# Patient Record
Sex: MALE | Race: WHITE | NOT HISPANIC OR LATINO | URBAN - METROPOLITAN AREA
[De-identification: names, ages, dates, MRNs, and addresses within clinical notes are randomized per-mention and may not be internally consistent; named-entity substitution may affect disease eponyms.]

---

## 2020-06-30 ENCOUNTER — OFFICE VISIT (OUTPATIENT)
Dept: URBAN - METROPOLITAN AREA CLINIC 94 | Facility: CLINIC | Age: 60
End: 2020-06-30
Payer: MEDICARE

## 2020-06-30 ENCOUNTER — WEB ENCOUNTER (OUTPATIENT)
Dept: URBAN - METROPOLITAN AREA CLINIC 94 | Facility: CLINIC | Age: 60
End: 2020-06-30

## 2020-06-30 DIAGNOSIS — K74.60 CIRRHOSIS: ICD-10-CM

## 2020-06-30 DIAGNOSIS — R13.10 DYSPHAGIA: ICD-10-CM

## 2020-06-30 PROCEDURE — 3017F COLORECTAL CA SCREEN DOC REV: CPT | Performed by: INTERNAL MEDICINE

## 2020-06-30 PROCEDURE — 99213 OFFICE O/P EST LOW 20 MIN: CPT | Performed by: INTERNAL MEDICINE

## 2020-06-30 PROCEDURE — G8420 CALC BMI NORM PARAMETERS: HCPCS | Performed by: INTERNAL MEDICINE

## 2020-06-30 PROCEDURE — G8427 DOCREV CUR MEDS BY ELIG CLIN: HCPCS | Performed by: INTERNAL MEDICINE

## 2020-06-30 RX ORDER — LACTULOSE 10 G/15ML
TAKE 30 MILLILITERS (20 GRAM) BY ORAL ROUTE ONCE DAILY FOR 30 DAYS SOLUTION ORAL 1
Qty: 900 | Refills: 6 | Status: ON HOLD | COMMUNITY
Start: 2020-01-27 | End: 2020-08-24

## 2020-06-30 RX ORDER — NADOLOL 20 MG/1
TAKE 1 TABLET (20 MG) BY ORAL ROUTE ONCE DAILY FOR 90 DAYS TABLET ORAL 1
Qty: 90 | Refills: 3 | Status: ACTIVE | COMMUNITY
Start: 2020-01-27 | End: 2021-01-21

## 2020-06-30 RX ORDER — LEVOTHYROXINE SODIUM 0.12 MG/1
1 TABLET IN THE MORNING ON AN EMPTY STOMACH TABLET ORAL
Status: ACTIVE | COMMUNITY

## 2020-06-30 RX ORDER — OMEPRAZOLE 40 MG/1
TAKE 1 CAPSULE BY ORAL ROUTE DAILY FOR 30 DAYS CAPSULE, DELAYED RELEASE PELLETS ORAL 1
Qty: 30 | Refills: 11 | Status: ACTIVE | COMMUNITY
Start: 2020-01-27 | End: 2021-01-21

## 2020-06-30 RX ORDER — RIFAXIMIN 550 MG/1
TAKE 1 TABLET (550 MG) BY ORAL ROUTE 2 TIMES PER DAY FOR 30 DAYS TABLET ORAL 2
Qty: 60 | Refills: 10 | Status: ACTIVE | COMMUNITY
Start: 2020-01-27 | End: 2020-12-22

## 2020-06-30 NOTE — HPI-TODAY'S VISIT:
Pt states that he continues to have dysphagia. He had jaw surgery several years ago and has developed dysphagia since then. He also has history of ETOH cirrhosis with esophageal varices which have been eradicated . Recent EGD 5/2020 showed benign intrinsic stenosis at [facility] which dilated to 15 mm. There were no esophageal varices. A hiatal hernioa was seen.  Swallowing improved transiently, but now having trouble swallowing solids again. GERD symptoms well controlled with PPI daily. From cirrhosis standpoint, HE well controlled with xifaxan.

## 2021-01-21 ENCOUNTER — ERX REFILL RESPONSE (OUTPATIENT)
Age: 61
End: 2021-01-21

## 2021-01-21 RX ORDER — NADOLOL 20 MG/1
TAKE 1 TABLET DAILY TABLET ORAL
Qty: 90 | Refills: 3

## 2021-02-01 ENCOUNTER — ERX REFILL RESPONSE (OUTPATIENT)
Age: 61
End: 2021-02-01

## 2021-02-01 RX ORDER — RIFAXIMIN 550 MG/1
TAKE 1 TABLET TWICE A DAY TABLET ORAL
Qty: 60 | Refills: 11

## 2021-04-05 ENCOUNTER — WEB ENCOUNTER (OUTPATIENT)
Dept: URBAN - METROPOLITAN AREA CLINIC 94 | Facility: CLINIC | Age: 61
End: 2021-04-05

## 2021-04-05 ENCOUNTER — OFFICE VISIT (OUTPATIENT)
Dept: URBAN - METROPOLITAN AREA CLINIC 94 | Facility: CLINIC | Age: 61
End: 2021-04-05
Payer: MEDICARE

## 2021-04-05 DIAGNOSIS — R13.10 DYSPHAGIA, UNSPECIFIED TYPE: ICD-10-CM

## 2021-04-05 DIAGNOSIS — K22.2 ESOPHAGEAL STRICTURE: ICD-10-CM

## 2021-04-05 PROCEDURE — 99213 OFFICE O/P EST LOW 20 MIN: CPT | Performed by: INTERNAL MEDICINE

## 2021-04-05 RX ORDER — LEVOTHYROXINE SODIUM 0.12 MG/1
1 TABLET IN THE MORNING ON AN EMPTY STOMACH TABLET ORAL
Status: ACTIVE | COMMUNITY

## 2021-04-05 RX ORDER — NADOLOL 20 MG/1
TAKE 1 TABLET DAILY TABLET ORAL
Qty: 90 | Refills: 3 | Status: ACTIVE | COMMUNITY

## 2021-04-05 RX ORDER — RIFAXIMIN 550 MG/1
TAKE 1 TABLET TWICE A DAY TABLET ORAL
Qty: 60 | Refills: 11 | Status: ACTIVE | COMMUNITY

## 2021-04-05 NOTE — HPI-TODAY'S VISIT:
Pt states that he has developed recurrent dysphagia for solids. He had jaw surgery several years ago and has developed dysphagia since then. He also has history of ETOH cirrhosis with esophageal varices which have been eradicated . EGD 5/2020 showed benign intrinsic stenosis at [facility] which dilated to 15 mm. There were no esophageal varices. Swallowing improved transiently, but then dysphagia recurred. Pt underwent repeat EGD with dilation with Dr Byrne at [facility] 11/2020. Pt states that agian dysphagia improved for a few months. He has now developed severe dysphagia for solids again.

## 2021-05-14 ENCOUNTER — OFFICE VISIT (OUTPATIENT)
Dept: URBAN - METROPOLITAN AREA SURGERY CENTER 17 | Facility: SURGERY CENTER | Age: 61
End: 2021-05-14
Payer: MEDICARE

## 2021-05-14 DIAGNOSIS — K22.2 ACQUIRED ESOPHAGEAL RING: ICD-10-CM

## 2021-05-14 DIAGNOSIS — R13.19 CERVICAL DYSPHAGIA: ICD-10-CM

## 2021-05-14 PROCEDURE — 43249 ESOPH EGD DILATION <30 MM: CPT | Performed by: INTERNAL MEDICINE

## 2021-05-14 PROCEDURE — G8907 PT DOC NO EVENTS ON DISCHARG: HCPCS | Performed by: INTERNAL MEDICINE

## 2021-06-07 ENCOUNTER — OFFICE VISIT (OUTPATIENT)
Dept: URBAN - METROPOLITAN AREA CLINIC 94 | Facility: CLINIC | Age: 61
End: 2021-06-07
Payer: MEDICARE

## 2021-06-07 ENCOUNTER — OFFICE VISIT (OUTPATIENT)
Dept: URBAN - METROPOLITAN AREA CLINIC 94 | Facility: CLINIC | Age: 61
End: 2021-06-07

## 2021-06-07 VITALS
TEMPERATURE: 98.2 F | BODY MASS INDEX: 22.05 KG/M2 | SYSTOLIC BLOOD PRESSURE: 121 MMHG | WEIGHT: 154 LBS | HEART RATE: 63 BPM | HEIGHT: 70 IN | DIASTOLIC BLOOD PRESSURE: 73 MMHG

## 2021-06-07 DIAGNOSIS — R13.19 CERVICAL DYSPHAGIA: ICD-10-CM

## 2021-06-07 DIAGNOSIS — K22.2 ESOPHAGEAL STRICTURE: ICD-10-CM

## 2021-06-07 DIAGNOSIS — K70.30 ALCOHOLIC CIRRHOSIS OF LIVER WITHOUT ASCITES: ICD-10-CM

## 2021-06-07 PROCEDURE — 99213 OFFICE O/P EST LOW 20 MIN: CPT | Performed by: INTERNAL MEDICINE

## 2021-06-07 RX ORDER — RIFAXIMIN 550 MG/1
TAKE 1 TABLET TWICE A DAY TABLET ORAL
Qty: 60 | Refills: 11 | Status: ACTIVE | COMMUNITY

## 2021-06-07 RX ORDER — NADOLOL 20 MG/1
TAKE 1 TABLET DAILY TABLET ORAL
Qty: 90 | Refills: 3 | Status: ACTIVE | COMMUNITY

## 2021-06-07 RX ORDER — LEVOTHYROXINE SODIUM 0.12 MG/1
1 TABLET IN THE MORNING ON AN EMPTY STOMACH TABLET ORAL
Status: ACTIVE | COMMUNITY

## 2021-06-07 NOTE — HPI-TODAY'S VISIT:
59 yo M returns today for post proedure f/u visit. EGD 5/2021 Esophageal stenosis with dilation otherwise normal esophagus, stomach, and duodenum. No Bx. Since his procedure his swallowing is doing much better. He is very careful with his oral intake and only tolerates liquids since having cancer.   Pt states that he has developed recurrent dysphagia for solids. 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 Secondary to this he develops esophageal strictures causing dypshagia previously responsive to EGD with dilation since then. He also has history of ETOH cirrhosis with esophageal varices which have been eradicated . EGD 5/2020 showed benign intrinsic stenosis at [facility] which dilated to 15 mm. There were no esophageal varices. Swallowing improved transiently, but then dysphagia recurred.

## 2021-06-08 LAB
A/G RATIO: 1.7
ALBUMIN: 4.5
ALKALINE PHOSPHATASE: 151
ALT (SGPT): 19
AST (SGOT): 33
BASO (ABSOLUTE): 0.1
BASOS: 1
BILIRUBIN, TOTAL: 0.2
BUN/CREATININE RATIO: 19
BUN: 25
CALCIUM: 9.9
CARBON DIOXIDE, TOTAL: 26
CHLORIDE: 101
CREATININE: 1.3
EGFR IF AFRICN AM: 68
EGFR IF NONAFRICN AM: 58
EOS (ABSOLUTE): 1.6
EOS: 20
GLOBULIN, TOTAL: 2.6
GLUCOSE: 115
HEMATOCRIT: 37.5
HEMATOLOGY COMMENTS:: (no result)
HEMOGLOBIN: 12.3
IMMATURE CELLS: (no result)
IMMATURE GRANS (ABS): 0
IMMATURE GRANULOCYTES: 0
INR: 1
LYMPHS (ABSOLUTE): 2.4
LYMPHS: 30
MCH: 30.5
MCHC: 32.8
MCV: 93
MONOCYTES(ABSOLUTE): 0.7
MONOCYTES: 8
NEUTROPHILS (ABSOLUTE): 3.3
NEUTROPHILS: 41
NRBC: (no result)
PLATELETS: 150
POTASSIUM: 4.7
PROTEIN, TOTAL: 7.1
PROTHROMBIN TIME: 10.9
RBC: 4.03
RDW: 12.7
SODIUM: 138
WBC: 8.1

## 2021-11-11 ENCOUNTER — TELEPHONE ENCOUNTER (OUTPATIENT)
Dept: URBAN - METROPOLITAN AREA CLINIC 94 | Facility: CLINIC | Age: 61
End: 2021-11-11

## 2021-11-18 ENCOUNTER — OFFICE VISIT (OUTPATIENT)
Dept: URBAN - METROPOLITAN AREA SURGERY CENTER 17 | Facility: SURGERY CENTER | Age: 61
End: 2021-11-18
Payer: MEDICARE

## 2021-11-18 ENCOUNTER — CLAIMS CREATED FROM THE CLAIM WINDOW (OUTPATIENT)
Dept: URBAN - METROPOLITAN AREA CLINIC 4 | Facility: CLINIC | Age: 61
End: 2021-11-18
Payer: MEDICARE

## 2021-11-18 DIAGNOSIS — K22.2 ACQUIRED ESOPHAGEAL RING: ICD-10-CM

## 2021-11-18 DIAGNOSIS — K22.89 ESOPHAGEAL BLEEDING: ICD-10-CM

## 2021-11-18 DIAGNOSIS — K22.8 OTHER SPECIFIED DISEASES OF ESOPHAGUS: ICD-10-CM

## 2021-11-18 DIAGNOSIS — R13.19 CERVICAL DYSPHAGIA: ICD-10-CM

## 2021-11-18 PROCEDURE — 88305 TISSUE EXAM BY PATHOLOGIST: CPT | Performed by: PATHOLOGY

## 2021-11-18 PROCEDURE — G8907 PT DOC NO EVENTS ON DISCHARG: HCPCS | Performed by: INTERNAL MEDICINE

## 2021-11-18 PROCEDURE — 43249 ESOPH EGD DILATION <30 MM: CPT | Performed by: INTERNAL MEDICINE

## 2021-11-18 PROCEDURE — 43239 EGD BIOPSY SINGLE/MULTIPLE: CPT | Performed by: INTERNAL MEDICINE

## 2021-11-18 RX ORDER — RIFAXIMIN 550 MG/1
TAKE 1 TABLET TWICE A DAY TABLET ORAL
Qty: 60 | Refills: 11 | Status: ACTIVE | COMMUNITY

## 2021-11-18 RX ORDER — PANTOPRAZOLE SODIUM 40 MG/1
1 TABLET TABLET, DELAYED RELEASE ORAL ONCE A DAY
Qty: 90 TABLET | Refills: 3 | OUTPATIENT
Start: 2021-11-18

## 2021-11-18 RX ORDER — NADOLOL 20 MG/1
TAKE 1 TABLET DAILY TABLET ORAL
Qty: 90 | Refills: 3 | Status: ACTIVE | COMMUNITY

## 2021-11-18 RX ORDER — LEVOTHYROXINE SODIUM 0.12 MG/1
1 TABLET IN THE MORNING ON AN EMPTY STOMACH TABLET ORAL
Status: ACTIVE | COMMUNITY

## 2021-12-03 ENCOUNTER — OFFICE VISIT (OUTPATIENT)
Dept: URBAN - METROPOLITAN AREA CLINIC 94 | Facility: CLINIC | Age: 61
End: 2021-12-03
Payer: MEDICARE

## 2021-12-03 VITALS
DIASTOLIC BLOOD PRESSURE: 84 MMHG | SYSTOLIC BLOOD PRESSURE: 135 MMHG | HEIGHT: 70 IN | HEART RATE: 62 BPM | BODY MASS INDEX: 18.75 KG/M2 | TEMPERATURE: 97 F | WEIGHT: 131 LBS

## 2021-12-03 DIAGNOSIS — R63.4 WEIGHT LOSS: ICD-10-CM

## 2021-12-03 DIAGNOSIS — R13.19 CERVICAL DYSPHAGIA: ICD-10-CM

## 2021-12-03 DIAGNOSIS — K70.30 ALCOHOLIC CIRRHOSIS OF LIVER WITHOUT ASCITES: ICD-10-CM

## 2021-12-03 DIAGNOSIS — K22.2 ESOPHAGEAL STRICTURE: ICD-10-CM

## 2021-12-03 PROCEDURE — 99214 OFFICE O/P EST MOD 30 MIN: CPT | Performed by: PHYSICIAN ASSISTANT

## 2021-12-03 RX ORDER — RIFAXIMIN 550 MG/1
TAKE 1 TABLET TWICE A DAY TABLET ORAL
Qty: 60 | Refills: 11 | Status: ACTIVE | COMMUNITY

## 2021-12-03 RX ORDER — NADOLOL 20 MG/1
TAKE 1 TABLET DAILY TABLET ORAL
Qty: 90 | Refills: 3 | Status: ACTIVE | COMMUNITY

## 2021-12-03 RX ORDER — PANTOPRAZOLE SODIUM 40 MG/1
1 TABLET TABLET, DELAYED RELEASE ORAL ONCE A DAY
Qty: 90 TABLET | Refills: 3 | Status: ACTIVE | COMMUNITY
Start: 2021-11-18

## 2021-12-03 RX ORDER — LEVOTHYROXINE SODIUM 0.12 MG/1
1 TABLET IN THE MORNING ON AN EMPTY STOMACH TABLET ORAL
Status: ACTIVE | COMMUNITY

## 2021-12-03 NOTE — HPI-TODAY'S VISIT:
59 yo M returns today for post procedure f/u visit.   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 Bx negative.   Since his procedure, the patient states that he feels swallowing is not any better and almost feels worse. He reports that he is no longer able to tolerate any solids foods. He is only able to drink Ensure for nutrition and has had a 20 lb wt loss since his last visit. He reports some wt loss from having COVID-19 3-4 weeks ago. Pt reports dysphagia with medications.   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 Secondary to this he develops esophageal strictures causing dysphagia previously responsive to EGD with dilation. He also has history of ETOH cirrhosis with esophageal varices .   EGD 5/2021 Esophageal stenosis with dilation otherwise normal esophagus, stomach, and duodenum. No Bx.   EGD 5/2020 showed benign intrinsic stenosis at [facility] which dilated to 15 mm. There were no esophageal varices. Swallowing improved transiently, but then dysphagia recurred.

## 2021-12-03 NOTE — PHYSICAL EXAM CONSTITUTIONAL:
well developed, well nourished , in no acute distress , ambulating without difficulty , normal communication ability dizziness while lying down

## 2021-12-04 LAB
A/G RATIO: 1.8
AFP, SERUM, TUMOR MARKER: 2.4
ALBUMIN: 4.8
ALKALINE PHOSPHATASE: 163
ALT (SGPT): 21
AST (SGOT): 41
BASO (ABSOLUTE): 0.1
BASOS: 1
BILIRUBIN, TOTAL: 0.5
BUN/CREATININE RATIO: 20
BUN: 27
CALCIUM: 10.5
CARBON DIOXIDE, TOTAL: 26
CHLORIDE: 101
CREATININE: 1.36
EGFR IF AFRICN AM: 64
EGFR IF NONAFRICN AM: 55
EOS (ABSOLUTE): 1.4
EOS: 18
GLOBULIN, TOTAL: 2.7
GLUCOSE: 114
HEMATOCRIT: 41.6
HEMATOLOGY COMMENTS:: (no result)
HEMOGLOBIN A1C: 5.7
HEMOGLOBIN: 13.5
IMMATURE CELLS: (no result)
IMMATURE GRANS (ABS): 0
IMMATURE GRANULOCYTES: 0
INR: 1
LYMPHS (ABSOLUTE): 2.2
LYMPHS: 28
MCH: 29.9
MCHC: 32.5
MCV: 92
MONOCYTES(ABSOLUTE): 0.8
MONOCYTES: 10
NEUTROPHILS (ABSOLUTE): 3.4
NEUTROPHILS: 43
NRBC: (no result)
PLATELETS: 164
POTASSIUM: 4.4
PROTEIN, TOTAL: 7.5
PROTHROMBIN TIME: 10.9
RBC: 4.51
RDW: 13
SODIUM: 140
WBC: 8

## 2021-12-14 ENCOUNTER — OUT OF OFFICE VISIT (OUTPATIENT)
Dept: URBAN - METROPOLITAN AREA MEDICAL CENTER 34 | Facility: MEDICAL CENTER | Age: 61
End: 2021-12-14
Payer: MEDICARE

## 2021-12-14 DIAGNOSIS — Z93.1 FEEDING BY G-TUBE: ICD-10-CM

## 2021-12-14 DIAGNOSIS — R63.4 ABNORMAL INTENTIONAL WEIGHT LOSS: ICD-10-CM

## 2021-12-14 DIAGNOSIS — K70.30 ALC (ALCOHOLIC LIVER CIRRHOSIS): ICD-10-CM

## 2021-12-14 DIAGNOSIS — Z87.19 H/O ACUTE PANCREATITIS: ICD-10-CM

## 2021-12-14 DIAGNOSIS — R13.13 PHARYNGEAL DYSPHAGIA: ICD-10-CM

## 2021-12-14 PROCEDURE — G8427 DOCREV CUR MEDS BY ELIG CLIN: HCPCS | Performed by: INTERNAL MEDICINE

## 2021-12-14 PROCEDURE — 99232 SBSQ HOSP IP/OBS MODERATE 35: CPT | Performed by: INTERNAL MEDICINE

## 2021-12-14 PROCEDURE — 99222 1ST HOSP IP/OBS MODERATE 55: CPT | Performed by: INTERNAL MEDICINE

## 2021-12-15 ENCOUNTER — OUT OF OFFICE VISIT (OUTPATIENT)
Dept: URBAN - METROPOLITAN AREA MEDICAL CENTER 34 | Facility: MEDICAL CENTER | Age: 61
End: 2021-12-15
Payer: MEDICARE

## 2021-12-15 DIAGNOSIS — R13.13 PHARYNGEAL DYSPHAGIA: ICD-10-CM

## 2021-12-15 DIAGNOSIS — K70.30 ALC (ALCOHOLIC LIVER CIRRHOSIS): ICD-10-CM

## 2021-12-15 DIAGNOSIS — R63.4 ABNORMAL INTENTIONAL WEIGHT LOSS: ICD-10-CM

## 2021-12-15 PROCEDURE — 99232 SBSQ HOSP IP/OBS MODERATE 35: CPT | Performed by: INTERNAL MEDICINE

## 2021-12-29 ENCOUNTER — OFFICE VISIT (OUTPATIENT)
Dept: URBAN - METROPOLITAN AREA CLINIC 94 | Facility: CLINIC | Age: 61
End: 2021-12-29

## 2022-01-19 ENCOUNTER — OFFICE VISIT (OUTPATIENT)
Dept: URBAN - METROPOLITAN AREA CLINIC 94 | Facility: CLINIC | Age: 62
End: 2022-01-19
Payer: MEDICARE

## 2022-01-19 VITALS
TEMPERATURE: 97.5 F | HEIGHT: 70 IN | SYSTOLIC BLOOD PRESSURE: 121 MMHG | HEART RATE: 68 BPM | BODY MASS INDEX: 18.75 KG/M2 | WEIGHT: 131 LBS | DIASTOLIC BLOOD PRESSURE: 73 MMHG

## 2022-01-19 DIAGNOSIS — Z93.1 GASTROSTOMY TUBE IN PLACE: ICD-10-CM

## 2022-01-19 DIAGNOSIS — K72.90 HEPATIC ENCEPHALOPATHY: ICD-10-CM

## 2022-01-19 DIAGNOSIS — K22.2 ESOPHAGEAL STRICTURE: ICD-10-CM

## 2022-01-19 DIAGNOSIS — K70.30 ALCOHOLIC CIRRHOSIS OF LIVER WITHOUT ASCITES: ICD-10-CM

## 2022-01-19 PROCEDURE — 99214 OFFICE O/P EST MOD 30 MIN: CPT | Performed by: PHYSICIAN ASSISTANT

## 2022-01-19 RX ORDER — RIFAXIMIN 550 MG/1
TAKE 1 TABLET TWICE A DAY TABLET ORAL
Qty: 60 | Refills: 11 | Status: ACTIVE | COMMUNITY

## 2022-01-19 RX ORDER — LEVOTHYROXINE SODIUM 0.12 MG/1
1 TABLET IN THE MORNING ON AN EMPTY STOMACH TABLET ORAL
Status: ACTIVE | COMMUNITY

## 2022-01-19 RX ORDER — PANTOPRAZOLE SODIUM 40 MG/1
1 TABLET TABLET, DELAYED RELEASE ORAL ONCE A DAY
Qty: 90 TABLET | Refills: 3 | Status: ACTIVE | COMMUNITY
Start: 2021-11-18

## 2022-01-19 RX ORDER — NADOLOL 20 MG/1
TAKE 1 TABLET DAILY TABLET ORAL
Qty: 90 | Refills: 3 | Status: ACTIVE | COMMUNITY

## 2022-01-19 NOTE — PHYSICAL EXAM GASTROINTESTINAL
Abdomen , soft, gastrostomy tube LUQ, mild tenderness around tube without erythema or drainage, nondistended , no guarding or rigidity , no masses palpable , normal bowel sounds , Liver and Spleen , no hepatomegaly present , no hepatosplenomegaly , liver nontender , spleen not palpable

## 2022-01-19 NOTE — HPI-TODAY'S VISIT:
61 yo M returns today for hx of cirrhosis and esophageal stenosis s/p gastrostomy tube. Pt is accompanied by wife.   Pt hospitalized at [facility] 12/13-12/17 for esophageal dysphagia with inability to tolerate solids or liquids without choking. MBS revealed Moderate /Severe oropharyngeal dysphagia. Pt recommended having gastrostomy tube placed again. Gastrostomy tube successfully placed on 12/16.   Today overall patient is doing well. He reports mild tenderness around gastrostomy site with palpation, but otherwise he is fine. Per pt wife, he has had a 10 lb weight again since having tube placed.  Pt denies N/V or hematemesis. Pt denies melena or hematochezia. Pt denies ever having a colonoscopy and refuses to have one. Pt's wife denies altered mental status.   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 Secondary to this he develops esophageal strictures causing dysphagia previously responsive to EGD with dilation. He also has history of ETOH cirrhosis with esophageal varices .   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 Bx negative.   EGD 5/2021 Esophageal stenosis with dilation otherwise normal esophagus, stomach, and duodenum. No Bx.

## 2022-01-19 NOTE — PHYSICAL EXAM CHEST:
no lesions,  no deformities,  no traumatic injuries,  no significant scars are present,  chest wall non-tender,  no masses present, breathing is unlabored without accessory muscle use,normal breath sounds pacemaker interrogated ([employer] device), no events that need emergent management, short self- limited atach?, repeat troponin trending upwards, Cardio Dr Veloz recommends third set. No symptoms now, chest pain free. Sign out to night team to follow repeat troponin and monitoring trop negative.  chest pain free.  requesting to go home.  has follow up information for his cardiologist.  Conservative management discussed with the patient in detail.  Close PMD follow up encouraged.  Strict ED return instructions discussed in detail and patient given the opportunity to ask any questions about their discharge diagnosis and instructions

## 2022-01-24 ENCOUNTER — TELEPHONE ENCOUNTER (OUTPATIENT)
Dept: URBAN - METROPOLITAN AREA CLINIC 94 | Facility: CLINIC | Age: 62
End: 2022-01-24

## 2022-02-10 ENCOUNTER — TELEPHONE ENCOUNTER (OUTPATIENT)
Dept: URBAN - METROPOLITAN AREA CLINIC 94 | Facility: CLINIC | Age: 62
End: 2022-02-10

## 2022-02-15 ENCOUNTER — ERX REFILL RESPONSE (OUTPATIENT)
Dept: URBAN - METROPOLITAN AREA CLINIC 52 | Facility: CLINIC | Age: 62
End: 2022-02-15

## 2022-02-15 RX ORDER — RIFAXIMIN 550 MG/1
TAKE 1 TABLET TWICE A DAY TABLET ORAL
Qty: 60 TABLET | Refills: 12 | OUTPATIENT

## 2022-06-21 ENCOUNTER — OFFICE VISIT (OUTPATIENT)
Dept: URBAN - METROPOLITAN AREA CLINIC 94 | Facility: CLINIC | Age: 62
End: 2022-06-21

## 2022-08-01 ENCOUNTER — OFFICE VISIT (OUTPATIENT)
Dept: URBAN - METROPOLITAN AREA CLINIC 94 | Facility: CLINIC | Age: 62
End: 2022-08-01

## 2022-08-01 RX ORDER — RIFAXIMIN 550 MG/1
TAKE 1 TABLET TWICE A DAY TABLET ORAL
Qty: 60 TABLET | Refills: 12 | Status: ACTIVE | COMMUNITY

## 2022-08-01 RX ORDER — PANTOPRAZOLE SODIUM 40 MG/1
1 TABLET TABLET, DELAYED RELEASE ORAL ONCE A DAY
Qty: 90 TABLET | Refills: 3 | Status: ACTIVE | COMMUNITY
Start: 2021-11-18

## 2022-08-01 RX ORDER — LEVOTHYROXINE SODIUM 0.12 MG/1
1 TABLET IN THE MORNING ON AN EMPTY STOMACH TABLET ORAL
Status: ACTIVE | COMMUNITY

## 2022-08-01 RX ORDER — NADOLOL 20 MG/1
TAKE 1 TABLET DAILY TABLET ORAL
Qty: 90 | Refills: 3 | Status: ACTIVE | COMMUNITY

## 2022-08-15 ENCOUNTER — OFFICE VISIT (OUTPATIENT)
Dept: URBAN - METROPOLITAN AREA CLINIC 94 | Facility: CLINIC | Age: 62
End: 2022-08-15
Payer: MEDICARE

## 2022-08-15 VITALS
TEMPERATURE: 97.7 F | HEART RATE: 83 BPM | BODY MASS INDEX: 20.76 KG/M2 | HEIGHT: 70 IN | SYSTOLIC BLOOD PRESSURE: 145 MMHG | WEIGHT: 145 LBS | DIASTOLIC BLOOD PRESSURE: 85 MMHG

## 2022-08-15 DIAGNOSIS — Z93.1 GASTROSTOMY TUBE IN PLACE: ICD-10-CM

## 2022-08-15 DIAGNOSIS — I70.1 RENAL ARTERY STENOSIS: ICD-10-CM

## 2022-08-15 DIAGNOSIS — K80.20 GALLSTONES: ICD-10-CM

## 2022-08-15 DIAGNOSIS — K70.30 ALCOHOLIC CIRRHOSIS OF LIVER WITHOUT ASCITES: ICD-10-CM

## 2022-08-15 PROBLEM — 440419004: Status: ACTIVE | Noted: 2022-01-19

## 2022-08-15 PROBLEM — 302233006: Status: ACTIVE | Noted: 2022-08-15

## 2022-08-15 PROCEDURE — 99214 OFFICE O/P EST MOD 30 MIN: CPT | Performed by: PHYSICIAN ASSISTANT

## 2022-08-15 RX ORDER — RIFAXIMIN 550 MG/1
TAKE 1 TABLET TWICE A DAY TABLET ORAL
Qty: 60 TABLET | Refills: 12 | Status: ACTIVE | COMMUNITY

## 2022-08-15 RX ORDER — LEVOTHYROXINE SODIUM 0.12 MG/1
1 TABLET IN THE MORNING ON AN EMPTY STOMACH TABLET ORAL
Status: ACTIVE | COMMUNITY

## 2022-08-15 RX ORDER — PANTOPRAZOLE SODIUM 40 MG/1
1 TABLET TABLET, DELAYED RELEASE ORAL ONCE A DAY
Qty: 90 | Refills: 3
Start: 2021-11-18

## 2022-08-15 RX ORDER — PANTOPRAZOLE SODIUM 40 MG/1
1 TABLET TABLET, DELAYED RELEASE ORAL ONCE A DAY
Qty: 90 TABLET | Refills: 3 | Status: ACTIVE | COMMUNITY
Start: 2021-11-18

## 2022-08-15 NOTE — HPI-TODAY'S VISIT:
60 yo M returns today for hx of cirrhosis and esophageal stenosis s/p gastrostomy tube. Pt is accompanied by wife.   Since his last visit, patient's chief complaint is abdominal pain around gastrostomy tube. He reports having to have the G-tube replaced every 3 mos by IR. He states that after tube is replaced he generally gets infection around stoma requiring antibiotics. He states that area is chronically very tender/sore. He denies drainage or blood. He denies infection today.   Pt otherwise is doing well. He has had a 15 lb weight gain since last visit. He denies epigastric pain or N/V. Pt denies problems tolerating TF and does still get minimal oral intake with Gatorade. Pt denies jaundice, itching, tobacco or ETOH use. He denies hematemesis, melena or hematochezia. Pt denies abdominal swelling.   Following his last visit, patient had RUQ US 2/2022 revealing gallstones, without signs of acute cholecystitis, distended GB. No focal lesions of liver This was then followed by CT - 2/2022 revealing gallstones, No acute cholecystitis, chronic liver disease with portal HTN, no focal lesions, mild GB distention, asymmetric Rt kidney due to renal artery stenosis.    MBS revealed Moderate /Severe oropharyngeal dysphagia Gastrostomy tube successfully placed on 12/16.   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 Secondary to this he develops esophageal strictures causing dysphagia previously responsive to EGD with dilation. He also has history of ETOH cirrhosis with esophageal varices .   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 Bx negative.   EGD 5/2021 Esophageal stenosis with dilation otherwise normal esophagus, stomach, and duodenum. No Bx.

## 2022-08-15 NOTE — PHYSICAL EXAM GASTROINTESTINAL
Abdomen , soft, gastrostomy tube LUQ- tender around G Tube without erythema noted, mild tenderness around tube without erythema or drainage, nondistended , no guarding or rigidity , no masses palpable , normal bowel sounds , Liver and Spleen , no hepatomegaly present , no hepatosplenomegaly , liver nontender , spleen not palpable

## 2022-08-16 LAB
A/G RATIO: 1.6
ABSOLUTE BASOPHILS: 41
ABSOLUTE EOSINOPHILS: 281
ABSOLUTE LYMPHOCYTES: 1127
ABSOLUTE MONOCYTES: 479
ABSOLUTE NEUTROPHILS: 3172
ALBUMIN: 4.7
ALKALINE PHOSPHATASE: 147
ALT (SGPT): 19
AST (SGOT): 32
BASOPHILS: 0.8
BILIRUBIN, TOTAL: 0.5
BUN/CREATININE RATIO: 29
BUN: 35
CALCIUM: 9.9
CARBON DIOXIDE, TOTAL: 29
CHLORIDE: 103
CREATININE: 1.21
EGFR: 67
EOSINOPHILS: 5.5
GLOBULIN, TOTAL: 2.9
GLUCOSE: 123
HEMATOCRIT: 37.2
HEMOGLOBIN: 12.4
INR: 1
LYMPHOCYTES: 22.1
MCH: 30.8
MCHC: 33.3
MCV: 92.5
MONOCYTES: 9.4
MPV: 13.7
NEUTROPHILS: 62.2
PLATELET COUNT: 87
POTASSIUM: 4.7
PROTEIN, TOTAL: 7.6
PT: 10.7
RDW: 13.4
RED BLOOD CELL COUNT: 4.02
SODIUM: 140
WHITE BLOOD CELL COUNT: 5.1

## 2022-09-29 ENCOUNTER — TELEPHONE ENCOUNTER (OUTPATIENT)
Dept: URBAN - METROPOLITAN AREA CLINIC 94 | Facility: CLINIC | Age: 62
End: 2022-09-29

## 2022-09-29 ENCOUNTER — TELEPHONE ENCOUNTER (OUTPATIENT)
Dept: URBAN - METROPOLITAN AREA CLINIC 52 | Facility: CLINIC | Age: 62
End: 2022-09-29

## 2022-10-05 ENCOUNTER — TELEPHONE ENCOUNTER (OUTPATIENT)
Dept: URBAN - METROPOLITAN AREA CLINIC 92 | Facility: CLINIC | Age: 62
End: 2022-10-05

## 2022-10-05 RX ORDER — GABAPENTIN 100 MG/1
1 CAPSULE CAPSULE ORAL
Qty: 120 | Refills: 3 | OUTPATIENT
Start: 2022-10-05

## 2022-10-05 RX ORDER — PANTOPRAZOLE SODIUM 40 MG/1
1 TABLET TABLET, DELAYED RELEASE ORAL ONCE A DAY
Qty: 90 | Refills: 3 | Status: ACTIVE | COMMUNITY
Start: 2021-11-18

## 2022-10-05 RX ORDER — LEVOTHYROXINE SODIUM 0.12 MG/1
1 TABLET IN THE MORNING ON AN EMPTY STOMACH TABLET ORAL
Status: ACTIVE | COMMUNITY

## 2022-10-05 RX ORDER — RIFAXIMIN 550 MG/1
TAKE 1 TABLET TWICE A DAY TABLET ORAL
Qty: 60 TABLET | Refills: 12 | Status: ACTIVE | COMMUNITY

## 2022-10-13 ENCOUNTER — OFFICE VISIT (OUTPATIENT)
Dept: URBAN - METROPOLITAN AREA CLINIC 94 | Facility: CLINIC | Age: 62
End: 2022-10-13
Payer: MEDICARE

## 2022-10-13 VITALS
DIASTOLIC BLOOD PRESSURE: 77 MMHG | HEIGHT: 70 IN | SYSTOLIC BLOOD PRESSURE: 122 MMHG | BODY MASS INDEX: 20.9 KG/M2 | TEMPERATURE: 97.2 F | WEIGHT: 146 LBS | HEART RATE: 71 BPM

## 2022-10-13 DIAGNOSIS — K94.20 GASTROSTOMY COMPLICATION: ICD-10-CM

## 2022-10-13 DIAGNOSIS — R13.10 DYSPHAGIA: ICD-10-CM

## 2022-10-13 DIAGNOSIS — K74.60 CIRRHOSIS: ICD-10-CM

## 2022-10-13 PROBLEM — 40739000 DYSPHAGIA: Status: ACTIVE | Noted: 2021-04-18

## 2022-10-13 PROCEDURE — 99213 OFFICE O/P EST LOW 20 MIN: CPT | Performed by: INTERNAL MEDICINE

## 2022-10-13 RX ORDER — PANTOPRAZOLE SODIUM 40 MG/1
1 TABLET TABLET, DELAYED RELEASE ORAL ONCE A DAY
Qty: 90 | Refills: 3 | Status: ACTIVE | COMMUNITY
Start: 2021-11-18

## 2022-10-13 RX ORDER — LEVOTHYROXINE SODIUM 0.12 MG/1
1 TABLET IN THE MORNING ON AN EMPTY STOMACH TABLET ORAL
Status: ACTIVE | COMMUNITY

## 2022-10-13 RX ORDER — GABAPENTIN 100 MG/1
1 CAPSULE CAPSULE ORAL ONCE A DAY
Status: ACTIVE | COMMUNITY

## 2022-10-13 RX ORDER — RIFAXIMIN 550 MG/1
TAKE 1 TABLET TWICE A DAY TABLET ORAL
Qty: 60 TABLET | Refills: 12 | Status: ACTIVE | COMMUNITY

## 2022-10-13 NOTE — HPI-TODAY'S VISIT:
S-He started gabapentin at low dose. Now 200 mg TID Gets minor relief from this. No apparent side effects. 62 yo M hx of cirrhosis and esophageal stenosis s/p gastrostomy tube.   Since his last visit, patient's chief complaint is abdominal pain around gastrostomy tube. He reports having to have the G-tube replaced every 3 mos by IR. He states that after tube is replaced he generally gets infection around stoma requiring antibiotics. He states that area is chronically very tender/sore. He denies drainage or blood. He denies infection today.   Pt otherwise is doing well. He has had a 15 lb weight gain since last visit. He denies epigastric pain or N/V. Pt denies problems tolerating TF and does still get minimal oral intake with Gatorade. Pt denies jaundice, itching, tobacco or ETOH use. He denies hematemesis, melena or hematochezia. Pt denies abdominal swelling.  RUQ US 2/2022 revealing gallstones, without signs of acute cholecystitis, distended GB. No focal lesions of liver CT - 2/2022 revealing gallstones, No acute cholecystitis, chronic liver disease with portal HTN, no focal lesions, mild GB distention, asymmetric Rt kidney due to renal artery stenosis.    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 Secondary to this he develops esophageal strictures causing dysphagia previously responsive to EGD with dilation. He also has history of ETOH cirrhosis with esophageal varices .   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 Bx negative.  EGD 5/2021 Esophageal stenosis with dilation otherwise normal esophagus, stomach, and duodenum. No Bx.

## 2022-10-13 NOTE — PHYSICAL EXAM GASTROINTESTINAL
Abdomen , soft, nontender, nondistended , no guarding or rigidity , no masses palpable , normal bowel sounds , Liver and Spleen,  no hepatosplenomegaly , liver nontender  G tube site-No significant redness or swelling. Minimal tissue fluid. Appears to be normal status.

## 2022-11-17 ENCOUNTER — OFFICE VISIT (OUTPATIENT)
Dept: URBAN - METROPOLITAN AREA CLINIC 94 | Facility: CLINIC | Age: 62
End: 2022-11-17

## 2022-11-17 RX ORDER — RIFAXIMIN 550 MG/1
TAKE 1 TABLET TWICE A DAY TABLET ORAL
Qty: 60 TABLET | Refills: 12 | Status: ACTIVE | COMMUNITY

## 2022-11-17 RX ORDER — PANTOPRAZOLE SODIUM 40 MG/1
1 TABLET TABLET, DELAYED RELEASE ORAL ONCE A DAY
Qty: 90 | Refills: 3 | Status: ACTIVE | COMMUNITY
Start: 2021-11-18

## 2022-11-17 RX ORDER — GABAPENTIN 100 MG/1
1 CAPSULE CAPSULE ORAL ONCE A DAY
Status: ACTIVE | COMMUNITY

## 2022-11-17 RX ORDER — LEVOTHYROXINE SODIUM 0.12 MG/1
1 TABLET IN THE MORNING ON AN EMPTY STOMACH TABLET ORAL
Status: ACTIVE | COMMUNITY

## 2023-01-09 ENCOUNTER — OFFICE VISIT (OUTPATIENT)
Dept: URBAN - METROPOLITAN AREA CLINIC 94 | Facility: CLINIC | Age: 63
End: 2023-01-09
Payer: MEDICARE

## 2023-01-09 VITALS
SYSTOLIC BLOOD PRESSURE: 120 MMHG | DIASTOLIC BLOOD PRESSURE: 84 MMHG | BODY MASS INDEX: 20.76 KG/M2 | HEIGHT: 70 IN | HEART RATE: 103 BPM | WEIGHT: 145 LBS | TEMPERATURE: 97.8 F

## 2023-01-09 DIAGNOSIS — K80.20 GALLSTONES: ICD-10-CM

## 2023-01-09 DIAGNOSIS — K70.30 ALCOHOLIC CIRRHOSIS OF LIVER WITHOUT ASCITES: ICD-10-CM

## 2023-01-09 DIAGNOSIS — K76.82 HEPATIC ENCEPHALOPATHY: ICD-10-CM

## 2023-01-09 DIAGNOSIS — K22.2 ESOPHAGEAL STRICTURE: ICD-10-CM

## 2023-01-09 PROBLEM — 63305008: Status: ACTIVE | Noted: 2021-04-05

## 2023-01-09 PROBLEM — 420054005: Status: ACTIVE | Noted: 2021-06-07

## 2023-01-09 PROBLEM — 40739000: Status: ACTIVE | Noted: 2021-04-05

## 2023-01-09 PROBLEM — 235919008: Status: ACTIVE | Noted: 2022-02-10

## 2023-01-09 PROCEDURE — 99214 OFFICE O/P EST MOD 30 MIN: CPT | Performed by: PHYSICIAN ASSISTANT

## 2023-01-09 RX ORDER — PANTOPRAZOLE SODIUM 40 MG/1
1 TABLET TABLET, DELAYED RELEASE ORAL ONCE A DAY
Qty: 90 | Refills: 3
Start: 2021-11-18

## 2023-01-09 RX ORDER — PANTOPRAZOLE SODIUM 40 MG/1
1 TABLET TABLET, DELAYED RELEASE ORAL ONCE A DAY
Qty: 90 | Refills: 3 | Status: ACTIVE | COMMUNITY
Start: 2021-11-18

## 2023-01-09 RX ORDER — LEVOTHYROXINE SODIUM 0.12 MG/1
1 TABLET IN THE MORNING ON AN EMPTY STOMACH TABLET ORAL
Status: ACTIVE | COMMUNITY

## 2023-01-09 RX ORDER — RIFAXIMIN 550 MG/1
TAKE 1 TABLET TWICE A DAY TABLET ORAL
Qty: 60 TABLET | Refills: 12 | Status: ACTIVE | COMMUNITY

## 2023-01-09 NOTE — HPI-TODAY'S VISIT:
61 yo M hx of cirrhosis and esophageal stenosis s/p gastrostomy tube.   Since his last visit, patient is doing much better. He denies abdominal pain or further complications related to his G Tube since radiologist recommended he use barrier cream around the area. He denies drainage, bleeding or irritation around area. He continues to have G Tube replaced every 3-6 mos by IR at [facility].   Weight has been stable.   Pt did have aspiration PNA in Oct 2022. Pt advised that only po intake should be ice chips.   Pt denies jaundice, itching, tobacco or ETOH use. He denies hematemesis, melena or hematochezia. Pt denies abdominal swelling.  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 Secondary to this he develops esophageal strictures causing dysphagia previously responsive to EGD with dilation. He also has history of ETOH cirrhosis with esophageal varices .   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 Bx negative.  EGD 5/2021 Esophageal stenosis with dilation otherwise normal esophagus, stomach, and duodenum. No Bx.

## 2023-01-10 LAB
A/G RATIO: 1.3
ABSOLUTE BASOPHILS: 49
ABSOLUTE EOSINOPHILS: 293
ABSOLUTE LYMPHOCYTES: 1391
ABSOLUTE MONOCYTES: 592
ABSOLUTE NEUTROPHILS: 3776
ALBUMIN: 4.4
ALKALINE PHOSPHATASE: 159
ALT (SGPT): 21
AST (SGOT): 39
BASOPHILS: 0.8
BILIRUBIN, TOTAL: 0.5
BUN/CREATININE RATIO: 23
BUN: 28
CALCIUM: 10
CARBON DIOXIDE, TOTAL: 32
CHLORIDE: 100
CREATININE: 1.21
EGFR: 67
EOSINOPHILS: 4.8
GLOBULIN, TOTAL: 3.5
GLUCOSE: 135
HEMATOCRIT: 38
HEMOGLOBIN: 12.8
INR: 1
LYMPHOCYTES: 22.8
MCH: 30.5
MCHC: 33.7
MCV: 90.5
MONOCYTES: 9.7
MPV: 14.3
NEUTROPHILS: 61.9
PLATELET COUNT: 118
POTASSIUM: 3.7
PROTEIN, TOTAL: 7.9
PT: 10.6
RDW: 13.2
RED BLOOD CELL COUNT: 4.2
SODIUM: 140
WHITE BLOOD CELL COUNT: 6.1

## 2023-02-20 ENCOUNTER — ERX REFILL RESPONSE (OUTPATIENT)
Dept: URBAN - METROPOLITAN AREA CLINIC 52 | Facility: CLINIC | Age: 63
End: 2023-02-20

## 2023-02-20 RX ORDER — RIFAXIMIN 550 MG/1
TAKE 1 TABLET TWICE A DAY TABLET ORAL
Qty: 60 TABLET | Refills: 11 | OUTPATIENT

## 2023-02-20 RX ORDER — RIFAXIMIN 550 MG/1
TAKE 1 TABLET TWICE A DAY TABLET ORAL
Qty: 60 TABLET | Refills: 12 | OUTPATIENT

## 2023-04-10 ENCOUNTER — OFFICE VISIT (OUTPATIENT)
Dept: URBAN - METROPOLITAN AREA CLINIC 94 | Facility: CLINIC | Age: 63
End: 2023-04-10

## 2023-04-25 ENCOUNTER — OFFICE VISIT (OUTPATIENT)
Dept: URBAN - METROPOLITAN AREA CLINIC 94 | Facility: CLINIC | Age: 63
End: 2023-04-25
Payer: MEDICARE

## 2023-04-25 VITALS
BODY MASS INDEX: 20.9 KG/M2 | TEMPERATURE: 97.6 F | WEIGHT: 146 LBS | SYSTOLIC BLOOD PRESSURE: 128 MMHG | DIASTOLIC BLOOD PRESSURE: 78 MMHG | HEIGHT: 70 IN | HEART RATE: 101 BPM

## 2023-04-25 DIAGNOSIS — K22.2 ESOPHAGEAL STRICTURE: ICD-10-CM

## 2023-04-25 DIAGNOSIS — K80.20 GALLSTONES: ICD-10-CM

## 2023-04-25 DIAGNOSIS — K70.30 ALCOHOLIC CIRRHOSIS OF LIVER WITHOUT ASCITES: ICD-10-CM

## 2023-04-25 DIAGNOSIS — K76.82 HEPATIC ENCEPHALOPATHY: ICD-10-CM

## 2023-04-25 PROCEDURE — 99214 OFFICE O/P EST MOD 30 MIN: CPT | Performed by: PHYSICIAN ASSISTANT

## 2023-04-25 RX ORDER — LEVOTHYROXINE SODIUM 0.12 MG/1
1 TABLET IN THE MORNING ON AN EMPTY STOMACH TABLET ORAL
Status: ACTIVE | COMMUNITY

## 2023-04-25 RX ORDER — PANTOPRAZOLE SODIUM 40 MG/1
1 TABLET TABLET, DELAYED RELEASE ORAL ONCE A DAY
Qty: 90 | Refills: 3 | Status: ACTIVE | COMMUNITY
Start: 2021-11-18

## 2023-04-25 RX ORDER — RIFAXIMIN 550 MG/1
TAKE 1 TABLET TWICE A DAY TABLET ORAL
Qty: 60 TABLET | Refills: 11 | Status: ACTIVE | COMMUNITY

## 2023-04-25 NOTE — PHYSICAL EXAM GASTROINTESTINAL
Abdomen , soft, nontender, nondistended , no guarding or rigidity , no masses palpable , normal bowel sounds , Liver and Spleen,  no hepatosplenomegaly , liver nontender G tube site-No redness or swelling.

## 2023-04-25 NOTE — HPI-TODAY'S VISIT:
61 yo M hx of cirrhosis and esophageal stenosis s/p gastrostomy tube. Pt accompanied by his wife.   Since his last visit, patient is doing much better. He denies abdominal pain or further complications related to his G Tube since radiologist recommended he use barrier cream around the area. He denies drainage, bleeding or irritation around area. He continues to have G Tube replaced every 3-6 mos by IR at [facility] [place].   Weight has been stable. Pt continues Protonix 40 mg daily. BMs at least 2-3/day. Pt continues Xifaxan and wife denies confusion.   Pt denies jaundice, N/V, itching or abdominal swelling or edema.   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 Secondary to this he develops esophageal strictures causing dysphagia previously responsive to EGD with dilation. He also has history of ETOH cirrhosis with esophageal varices .   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 Bx negative.  EGD 5/2021 Esophageal stenosis with dilation otherwise normal esophagus, stomach, and duodenum. No Bx.

## 2023-04-26 LAB
A/G RATIO: 1.7
ABSOLUTE BASOPHILS: 48
ABSOLUTE EOSINOPHILS: 329
ABSOLUTE LYMPHOCYTES: 1855
ABSOLUTE MONOCYTES: 663
ABSOLUTE NEUTROPHILS: 2406
ALBUMIN: 4.5
ALKALINE PHOSPHATASE: 167
ALT (SGPT): 21
AST (SGOT): 33
BASOPHILS: 0.9
BILIRUBIN, TOTAL: 0.6
BUN/CREATININE RATIO: 27
BUN: 36
CALCIUM: 10.2
CARBON DIOXIDE, TOTAL: 30
CHLORIDE: 104
CREATININE: 1.33
EGFR: 60
EOSINOPHILS: 6.2
GLOBULIN, TOTAL: 2.7
GLUCOSE: 119
HEMATOCRIT: 36.5
HEMOGLOBIN: 12.4
INR: 1
LYMPHOCYTES: 35
MCH: 29.7
MCHC: 34
MCV: 87.3
MONOCYTES: 12.5
MPV: 13.7
NEUTROPHILS: 45.4
PLATELET COUNT: 103
POTASSIUM: 4.2
PROTEIN, TOTAL: 7.2
PT: 10.7
RDW: 13.9
RED BLOOD CELL COUNT: 4.18
SODIUM: 142
WHITE BLOOD CELL COUNT: 5.3

## 2023-08-30 NOTE — PHYSICAL EXAM EYES:
Conjuntivae and eyelids appear normal, Sclerae : White without injection Suturegard Intro: Intraoperative tissue expansion was performed, utilizing the SUTUREGARD device, in order to reduce wound tension.

## 2023-10-30 ENCOUNTER — OFFICE VISIT (OUTPATIENT)
Dept: URBAN - METROPOLITAN AREA CLINIC 94 | Facility: CLINIC | Age: 63
End: 2023-10-30

## 2024-01-02 ENCOUNTER — LAB OUTSIDE AN ENCOUNTER (OUTPATIENT)
Dept: URBAN - METROPOLITAN AREA CLINIC 94 | Facility: CLINIC | Age: 64
End: 2024-01-02

## 2024-01-02 ENCOUNTER — OFFICE VISIT (OUTPATIENT)
Dept: URBAN - METROPOLITAN AREA CLINIC 94 | Facility: CLINIC | Age: 64
End: 2024-01-02
Payer: MEDICARE

## 2024-01-02 VITALS
OXYGEN SATURATION: 96 % | DIASTOLIC BLOOD PRESSURE: 83 MMHG | HEIGHT: 70 IN | SYSTOLIC BLOOD PRESSURE: 141 MMHG | HEART RATE: 110 BPM | WEIGHT: 152 LBS | BODY MASS INDEX: 21.76 KG/M2 | TEMPERATURE: 98.2 F

## 2024-01-02 DIAGNOSIS — K70.30 ALCOHOLIC CIRRHOSIS OF LIVER WITHOUT ASCITES: ICD-10-CM

## 2024-01-02 DIAGNOSIS — K76.82 HEPATIC ENCEPHALOPATHY: ICD-10-CM

## 2024-01-02 DIAGNOSIS — K22.2 ESOPHAGEAL STRICTURE: ICD-10-CM

## 2024-01-02 DIAGNOSIS — K80.20 GALLSTONES: ICD-10-CM

## 2024-01-02 PROBLEM — 431573004: Status: ACTIVE | Noted: 2024-01-02

## 2024-01-02 PROCEDURE — 99214 OFFICE O/P EST MOD 30 MIN: CPT | Performed by: PHYSICIAN ASSISTANT

## 2024-01-02 RX ORDER — LEVOTHYROXINE SODIUM 0.12 MG/1
1 TABLET IN THE MORNING ON AN EMPTY STOMACH TABLET ORAL
Status: ACTIVE | COMMUNITY

## 2024-01-02 RX ORDER — RIFAXIMIN 550 MG/1
TAKE 1 TABLET TWICE A DAY TABLET ORAL
Qty: 60 TABLET | Refills: 11 | Status: ACTIVE | COMMUNITY

## 2024-01-02 RX ORDER — PANTOPRAZOLE SODIUM 40 MG/1
1 TABLET TABLET, DELAYED RELEASE ORAL ONCE A DAY
Qty: 90 | Refills: 3 | Status: ACTIVE | COMMUNITY
Start: 2021-11-18

## 2024-01-02 NOTE — HPI-TODAY'S VISIT:
63 yo M hx of cirrhosis and esophageal stenosis s/p gastrostomy tube. Pt accompanied by his wife.   Pt reports a 3 mos of being unable to swallow. He states that throat feels very tight and he avelino has difficulty yawning. He reports only oral intake is a popsicle because everything else gets stuck or will come back up. Pt reports developing aspiration PNA 3-4 mos ago.    He denies abdominal pain or further complications related to his G Tube.  He denies drainage, bleeding or irritation around area. He continues to have G Tube replaced every 3-6 mos by IR at [facility].   Weight has been stable. Pt continues Protonix 40 mg daily. BMs at least 2-3/day. Pt continues Xifaxan and wife denies confusion.   Pt denies jaundice, N/V, itching or abdominal swelling or edema.   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 Secondary to this he develops esophageal strictures causing dysphagia previously responsive to EGD with dilation. He also has history of ETOH cirrhosis with esophageal varices .   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 Bx negative.  EGD 5/2021 Esophageal stenosis with dilation otherwise normal esophagus, stomach, and duodenum. No Bx.

## 2024-01-04 LAB
A/G RATIO: 1.7
ABSOLUTE BASOPHILS: 42
ABSOLUTE EOSINOPHILS: 222
ABSOLUTE LYMPHOCYTES: 1716
ABSOLUTE MONOCYTES: 576
ABSOLUTE NEUTROPHILS: 3444
ALBUMIN: 4.7
ALKALINE PHOSPHATASE: 144
ALT (SGPT): 17
AST (SGOT): 32
BASOPHILS: 0.7
BILIRUBIN, TOTAL: 0.6
BUN/CREATININE RATIO: (no result)
BUN: 15
CALCIUM: 10.3
CARBON DIOXIDE, TOTAL: 30
CHLORIDE: 102
CREATININE: 1.33
EGFR: 59
EOSINOPHILS: 3.7
GLOBULIN, TOTAL: 2.7
GLUCOSE: 114
HEMATOCRIT: 43.5
HEMOGLOBIN: 14.4
INR: 1.1
LYMPHOCYTES: 28.6
MCH: 29.7
MCHC: 33.1
MCV: 89.7
MONOCYTES: 9.6
MPV: 13.9
NEUTROPHILS: 57.4
PLATELET COUNT: 134
POTASSIUM: 3.9
PROTEIN, TOTAL: 7.4
PT: 11.4
RDW: 13.6
RED BLOOD CELL COUNT: 4.85
SODIUM: 142
WHITE BLOOD CELL COUNT: 6

## 2024-01-12 ENCOUNTER — TELEPHONE ENCOUNTER (OUTPATIENT)
Dept: URBAN - METROPOLITAN AREA CLINIC 94 | Facility: CLINIC | Age: 64
End: 2024-01-12

## 2024-01-30 ENCOUNTER — OFFICE VISIT (OUTPATIENT)
Dept: URBAN - METROPOLITAN AREA CLINIC 94 | Facility: CLINIC | Age: 64
End: 2024-01-30
Payer: MEDICARE

## 2024-01-30 ENCOUNTER — DASHBOARD ENCOUNTERS (OUTPATIENT)
Age: 64
End: 2024-01-30

## 2024-01-30 VITALS
TEMPERATURE: 97.3 F | OXYGEN SATURATION: 97 % | DIASTOLIC BLOOD PRESSURE: 65 MMHG | HEIGHT: 70 IN | WEIGHT: 152 LBS | SYSTOLIC BLOOD PRESSURE: 100 MMHG | BODY MASS INDEX: 21.76 KG/M2 | HEART RATE: 94 BPM

## 2024-01-30 DIAGNOSIS — K80.20 GALLSTONES: ICD-10-CM

## 2024-01-30 DIAGNOSIS — K70.30 ALCOHOLIC CIRRHOSIS OF LIVER WITHOUT ASCITES: ICD-10-CM

## 2024-01-30 DIAGNOSIS — K22.2 ESOPHAGEAL STRICTURE: ICD-10-CM

## 2024-01-30 DIAGNOSIS — K76.82 HEPATIC ENCEPHALOPATHY: ICD-10-CM

## 2024-01-30 PROCEDURE — 99213 OFFICE O/P EST LOW 20 MIN: CPT | Performed by: PHYSICIAN ASSISTANT

## 2024-01-30 RX ORDER — PANTOPRAZOLE SODIUM 40 MG/1
1 TABLET TABLET, DELAYED RELEASE ORAL ONCE A DAY
Qty: 90 | Refills: 3 | Status: ACTIVE | COMMUNITY
Start: 2021-11-18

## 2024-01-30 RX ORDER — RIFAXIMIN 550 MG/1
TAKE 1 TABLET TWICE A DAY TABLET ORAL
Qty: 60 TABLET | Refills: 11 | Status: ACTIVE | COMMUNITY

## 2024-01-30 RX ORDER — LEVOTHYROXINE SODIUM 0.12 MG/1
1 TABLET IN THE MORNING ON AN EMPTY STOMACH TABLET ORAL
Status: ACTIVE | COMMUNITY

## 2024-01-30 NOTE — HPI-TODAY'S VISIT:
61 yo M hx of cirrhosis and esophageal stenosis s/p gastrostomy tube. Pt accompanied by his wife.   Following last visit, patient had f/u CT neck - 1/2024- revealing post operative changes and no signs of new disease or recurrence. No lymph nodes.   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   He denies abdominal pain or further complications related to his G Tube.  He denies drainage, bleeding or irritation around area. He continues to have G Tube replaced every 3-6 mos by IR at [facility].   Weight has been stable. Pt continues Protonix 40 mg daily. BMs at least 2-3/day. Pt continues Xifaxan and wife denies confusion.   Pt denies jaundice, N/V, itching or abdominal swelling or edema.   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 Secondary to this he develops esophageal strictures causing dysphagia previously responsive to EGD with dilation. He also has history of ETOH cirrhosis with esophageal varices .   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 Bx negative.  EGD 5/2021 Esophageal stenosis with dilation otherwise normal esophagus, stomach, and duodenum. No Bx.

## 2024-08-05 ENCOUNTER — TELEPHONE ENCOUNTER (OUTPATIENT)
Dept: URBAN - METROPOLITAN AREA CLINIC 94 | Facility: CLINIC | Age: 64
End: 2024-08-05

## 2024-08-05 ENCOUNTER — OFFICE VISIT (OUTPATIENT)
Dept: URBAN - METROPOLITAN AREA CLINIC 94 | Facility: CLINIC | Age: 64
End: 2024-08-05
Payer: MEDICARE

## 2024-08-05 ENCOUNTER — LAB OUTSIDE AN ENCOUNTER (OUTPATIENT)
Dept: URBAN - METROPOLITAN AREA CLINIC 94 | Facility: CLINIC | Age: 64
End: 2024-08-05

## 2024-08-05 VITALS
SYSTOLIC BLOOD PRESSURE: 117 MMHG | HEART RATE: 92 BPM | BODY MASS INDEX: 21.9 KG/M2 | HEIGHT: 70 IN | WEIGHT: 153 LBS | DIASTOLIC BLOOD PRESSURE: 82 MMHG | TEMPERATURE: 98.9 F | OXYGEN SATURATION: 95 %

## 2024-08-05 DIAGNOSIS — K70.30 ALCOHOLIC CIRRHOSIS OF LIVER WITHOUT ASCITES: ICD-10-CM

## 2024-08-05 DIAGNOSIS — K80.20 GALLSTONES: ICD-10-CM

## 2024-08-05 DIAGNOSIS — K22.2 ESOPHAGEAL STRICTURE: ICD-10-CM

## 2024-08-05 DIAGNOSIS — K76.82 HEPATIC ENCEPHALOPATHY: ICD-10-CM

## 2024-08-05 PROCEDURE — 99214 OFFICE O/P EST MOD 30 MIN: CPT | Performed by: PHYSICIAN ASSISTANT

## 2024-08-05 RX ORDER — LEVOTHYROXINE SODIUM 0.12 MG/1
1 TABLET IN THE MORNING ON AN EMPTY STOMACH TABLET ORAL
Status: ACTIVE | COMMUNITY

## 2024-08-05 RX ORDER — RIFAXIMIN 550 MG/1
TAKE 1 TABLET TWICE A DAY TABLET ORAL
Qty: 60 TABLET | Refills: 11 | Status: ACTIVE | COMMUNITY

## 2024-08-05 RX ORDER — PANTOPRAZOLE SODIUM 40 MG/1
TAKE 1 TABLET BY MOUTH EVERY DAY FOR 90 DAYS TABLET, DELAYED RELEASE ORAL
Qty: 90 TABLET | Refills: 3 | Status: ACTIVE | COMMUNITY

## 2024-08-05 NOTE — HPI-TODAY'S VISIT:
61 yo M hx of cirrhosis and esophageal stenosis s/p gastrostomy tube. Pt accompanied by his wife.   Pt stable since last visit. Wt stable. BMs daily. Denies abdominal pain, itching or jaundice. Denies bloody or black stools. He denies complications related to his G Tube.  He denies drainage, bleeding or irritation around area. He continues to have G Tube replaced every 3-6 mos by IR at [facility].   Pt continues Protonix 40 mg daily. BMs at least 2-3/day. Pt continues Xifaxan and wife denies confusion.  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 Secondary to this he develops esophageal strictures causing dysphagia previously responsive to EGD with dilation. He also has history of ETOH cirrhosis with esophageal varices .   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 Bx negative.  EGD 5/2021 Esophageal stenosis with dilation otherwise normal esophagus, stomach, and duodenum. No Bx.

## 2024-08-06 LAB
A/G RATIO: 1.8
ABSOLUTE BASOPHILS: 39
ABSOLUTE EOSINOPHILS: 310
ABSOLUTE LYMPHOCYTES: 1165
ABSOLUTE MONOCYTES: 409
ABSOLUTE NEUTROPHILS: 2378
ALBUMIN: 4.4
ALKALINE PHOSPHATASE: 151
ALT (SGPT): 16
AST (SGOT): 32
BASOPHILS: 0.9
BILIRUBIN, TOTAL: 0.5
BUN/CREATININE RATIO: (no result)
BUN: 22
CALCIUM: 9.6
CARBON DIOXIDE, TOTAL: 30
CHLORIDE: 99
CREATININE: 1.23
EGFR: 65
EOSINOPHILS: 7.2
GLOBULIN, TOTAL: 2.5
GLUCOSE: 115
HEMATOCRIT: 38.6
HEMOGLOBIN: 12.6
INR: 1.1
LYMPHOCYTES: 27.1
MCH: 30.7
MCHC: 32.6
MCV: 94.1
MONOCYTES: 9.5
MPV: 13.8
NEUTROPHILS: 55.3
PLATELET COUNT: 96
POTASSIUM: 4.1
PROTEIN, TOTAL: 6.9
PT: 11.4
RDW: 13
RED BLOOD CELL COUNT: 4.1
SODIUM: 137
WHITE BLOOD CELL COUNT: 4.3

## 2025-02-06 ENCOUNTER — OFFICE VISIT (OUTPATIENT)
Dept: URBAN - METROPOLITAN AREA CLINIC 94 | Facility: CLINIC | Age: 65
End: 2025-02-06

## 2025-02-08 ENCOUNTER — ERX REFILL RESPONSE (OUTPATIENT)
Dept: URBAN - METROPOLITAN AREA CLINIC 94 | Facility: CLINIC | Age: 65
End: 2025-02-08

## 2025-02-08 RX ORDER — PANTOPRAZOLE SODIUM 40 MG/1
TAKE 1 TABLET BY MOUTH EVERY DAY FOR 90 DAYS TABLET, DELAYED RELEASE ORAL
Qty: 90 TABLET | Refills: 3 | OUTPATIENT

## 2025-02-08 RX ORDER — PANTOPRAZOLE SODIUM 40 MG/1
TAKE 1 TABLET BY MOUTH EVERY DAY FOR 90 DAYS TABLET, DELAYED RELEASE ORAL
Qty: 90 TABLET | Refills: 1 | OUTPATIENT

## 2025-02-17 ENCOUNTER — OFFICE VISIT (OUTPATIENT)
Dept: URBAN - METROPOLITAN AREA CLINIC 94 | Facility: CLINIC | Age: 65
End: 2025-02-17

## 2025-02-17 RX ORDER — RIFAXIMIN 550 MG/1
TAKE 1 TABLET TWICE A DAY TABLET ORAL
Qty: 60 TABLET | Refills: 11 | Status: ACTIVE | COMMUNITY

## 2025-02-17 RX ORDER — PANTOPRAZOLE SODIUM 40 MG/1
TAKE 1 TABLET BY MOUTH EVERY DAY FOR 90 DAYS TABLET, DELAYED RELEASE ORAL
Qty: 90 TABLET | Refills: 1 | Status: ACTIVE | COMMUNITY

## 2025-02-17 RX ORDER — LEVOTHYROXINE SODIUM 0.12 MG/1
1 TABLET IN THE MORNING ON AN EMPTY STOMACH TABLET ORAL
Status: ACTIVE | COMMUNITY

## 2025-02-19 ENCOUNTER — OFFICE VISIT (OUTPATIENT)
Dept: URBAN - METROPOLITAN AREA CLINIC 94 | Facility: CLINIC | Age: 65
End: 2025-02-19

## 2025-02-25 ENCOUNTER — OFFICE VISIT (OUTPATIENT)
Dept: URBAN - METROPOLITAN AREA CLINIC 94 | Facility: CLINIC | Age: 65
End: 2025-02-25

## 2025-02-26 ENCOUNTER — OFFICE VISIT (OUTPATIENT)
Dept: URBAN - METROPOLITAN AREA CLINIC 94 | Facility: CLINIC | Age: 65
End: 2025-02-26
Payer: MEDICARE

## 2025-02-26 ENCOUNTER — LAB OUTSIDE AN ENCOUNTER (OUTPATIENT)
Dept: URBAN - METROPOLITAN AREA CLINIC 94 | Facility: CLINIC | Age: 65
End: 2025-02-26

## 2025-02-26 VITALS
OXYGEN SATURATION: 93 % | BODY MASS INDEX: 22.05 KG/M2 | HEIGHT: 70 IN | DIASTOLIC BLOOD PRESSURE: 71 MMHG | TEMPERATURE: 98.1 F | SYSTOLIC BLOOD PRESSURE: 120 MMHG | HEART RATE: 88 BPM | WEIGHT: 154 LBS

## 2025-02-26 DIAGNOSIS — K22.2 ESOPHAGEAL STRICTURE: ICD-10-CM

## 2025-02-26 DIAGNOSIS — K76.82 HEPATIC ENCEPHALOPATHY: ICD-10-CM

## 2025-02-26 DIAGNOSIS — K80.20 GALLSTONES: ICD-10-CM

## 2025-02-26 DIAGNOSIS — Z85.819 HISTORY OF ORAL CANCER: ICD-10-CM

## 2025-02-26 DIAGNOSIS — K70.30 ALCOHOLIC CIRRHOSIS OF LIVER WITHOUT ASCITES: ICD-10-CM

## 2025-02-26 PROCEDURE — 99214 OFFICE O/P EST MOD 30 MIN: CPT | Performed by: PHYSICIAN ASSISTANT

## 2025-02-26 RX ORDER — RIFAXIMIN 550 MG/1
TAKE 1 TABLET TWICE A DAY TABLET ORAL
Qty: 60 TABLET | Refills: 11 | Status: ACTIVE | COMMUNITY

## 2025-02-26 RX ORDER — PANTOPRAZOLE SODIUM 40 MG/1
TAKE 1 TABLET BY MOUTH EVERY DAY FOR 90 DAYS TABLET, DELAYED RELEASE ORAL
Qty: 90 TABLET | Refills: 1 | Status: ACTIVE | COMMUNITY

## 2025-02-26 RX ORDER — LEVOTHYROXINE SODIUM 0.12 MG/1
1 TABLET IN THE MORNING ON AN EMPTY STOMACH TABLET ORAL
Status: ACTIVE | COMMUNITY

## 2025-02-26 NOTE — HPI-TODAY'S VISIT:
63 yo M hx of compensated cirrhosis and esophageal stenosis s/p gastrostomy tube. Pt accompanied by his wife.   Pt stable since last visit. Wt stable. BMs daily. Denies abdominal pain, itching or jaundice. Denies bloody or black stools. He denies complications related to his G Tube. Just had exchanged.  He denies drainage, bleeding or irritation around area. He continues to have G Tube replaced every 3-6 mos by IR at [facility].   Pt continues Protonix 40 mg daily. BMs at least 1-2/day. Pt continues Xifaxan and wife denies confusion.  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 Secondary to this he develops esophageal strictures causing dysphagia previously responsive to EGD with dilation. He also has history of ETOH cirrhosis with esophageal varices .   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 Bx negative.  EGD 5/2021 Esophageal stenosis with dilation otherwise normal esophagus, stomach, and duodenum. No Bx.

## 2025-04-09 ENCOUNTER — TELEPHONE ENCOUNTER (OUTPATIENT)
Dept: URBAN - METROPOLITAN AREA CLINIC 94 | Facility: CLINIC | Age: 65
End: 2025-04-09

## 2025-04-09 RX ORDER — RIFAXIMIN 550 MG/1
TAKE 1 TABLET TWICE A DAY TABLET ORAL TWICE A DAY
Qty: 60 TABLETS | Refills: 11

## 2025-04-09 RX ORDER — RIFAXIMIN 550 MG/1
TAKE 1 TABLET TWICE A DAY TABLET ORAL
Qty: 60 TABLET | Refills: 11

## 2025-08-14 ENCOUNTER — ERX REFILL RESPONSE (OUTPATIENT)
Dept: URBAN - METROPOLITAN AREA CLINIC 94 | Facility: CLINIC | Age: 65
End: 2025-08-14

## 2025-08-14 RX ORDER — PANTOPRAZOLE SODIUM 40 MG/1
TAKE 1 TABLET BY MOUTH EVERY DAY FOR 90 DAYS TABLET, DELAYED RELEASE ORAL
Qty: 90 TABLET | Refills: 1 | OUTPATIENT

## 2025-08-14 RX ORDER — PANTOPRAZOLE SODIUM 40 MG/1
TAKE 1 TABLET BY MOUTH EVERY DAY TABLET, DELAYED RELEASE ORAL
Qty: 90 TABLET | Refills: 1 | OUTPATIENT

## 2025-08-27 ENCOUNTER — OFFICE VISIT (OUTPATIENT)
Dept: URBAN - METROPOLITAN AREA CLINIC 94 | Facility: CLINIC | Age: 65
End: 2025-08-27
Payer: MEDICARE

## 2025-08-27 DIAGNOSIS — K22.2 ESOPHAGEAL STRICTURE: ICD-10-CM

## 2025-08-27 DIAGNOSIS — K70.30 ALCOHOLIC CIRRHOSIS OF LIVER WITHOUT ASCITES: ICD-10-CM

## 2025-08-27 DIAGNOSIS — K80.20 GALLSTONES: ICD-10-CM

## 2025-08-27 DIAGNOSIS — K76.82 HEPATIC ENCEPHALOPATHY: ICD-10-CM

## 2025-08-27 DIAGNOSIS — Z85.819 HISTORY OF ORAL CANCER: ICD-10-CM

## 2025-08-27 PROCEDURE — 99214 OFFICE O/P EST MOD 30 MIN: CPT | Performed by: PHYSICIAN ASSISTANT

## 2025-08-27 RX ORDER — PANTOPRAZOLE SODIUM 40 MG/1
TAKE 1 TABLET BY MOUTH EVERY DAY TABLET, DELAYED RELEASE ORAL
Qty: 90 TABLET | Refills: 1 | Status: ACTIVE | COMMUNITY

## 2025-08-27 RX ORDER — RIFAXIMIN 550 MG/1
TAKE 1 TABLET TWICE A DAY TABLET ORAL TWICE A DAY
Qty: 60 TABLETS | Refills: 11 | Status: ACTIVE | COMMUNITY

## 2025-08-27 RX ORDER — LEVOTHYROXINE SODIUM 0.12 MG/1
1 TABLET IN THE MORNING ON AN EMPTY STOMACH TABLET ORAL
Status: ACTIVE | COMMUNITY

## 2025-08-28 ENCOUNTER — TELEPHONE ENCOUNTER (OUTPATIENT)
Dept: URBAN - METROPOLITAN AREA CLINIC 94 | Facility: CLINIC | Age: 65
End: 2025-08-28

## 2025-08-30 LAB
A/G RATIO: 1.7
ABSOLUTE BASOPHILS: 38
ABSOLUTE EOSINOPHILS: 248
ABSOLUTE LYMPHOCYTES: 1442
ABSOLUTE MONOCYTES: 572
ABSOLUTE NEUTROPHILS: 3100
AFP, SERUM: 2.9
AFP-L3%, SERUM: (no result)
ALBUMIN: 4.4
ALKALINE PHOSPHATASE: 155
ALT (SGPT): 21
AST (SGOT): 32
BASOPHILS: 0.7
BILIRUBIN, TOTAL: 0.7
BUN/CREATININE RATIO: 17
BUN: 24
CALCIUM: 10
CARBON DIOXIDE, TOTAL: 31
CHLORIDE: 99
CREATININE: 1.42
EGFR: 54
EOSINOPHILS: 4.6
GLOBULIN, TOTAL: 2.6
GLUCOSE: 118
HEMATOCRIT: 37.7
HEMOGLOBIN: 12.6
INR: 1.1
LYMPHOCYTES: 26.7
MCH: 31
MCHC: 33.4
MCV: 92.9
MONOCYTES: 10.6
MPV: 14.1
NEUTROPHILS: 57.4
PLATELET COUNT: 99
POTASSIUM: 4.5
PROTEIN, TOTAL: 7
PT: 11.2
RDW: 13.4
RED BLOOD CELL COUNT: 4.06
SODIUM: 138
WHITE BLOOD CELL COUNT: 5.4